# Patient Record
Sex: FEMALE | URBAN - METROPOLITAN AREA
[De-identification: names, ages, dates, MRNs, and addresses within clinical notes are randomized per-mention and may not be internally consistent; named-entity substitution may affect disease eponyms.]

---

## 2021-11-30 ENCOUNTER — APPOINTMENT (RX ONLY)
Dept: URBAN - METROPOLITAN AREA CLINIC 41 | Facility: CLINIC | Age: 51
Setting detail: DERMATOLOGY
End: 2021-11-30

## 2021-11-30 DIAGNOSIS — Z41.9 ENCOUNTER FOR PROCEDURE FOR PURPOSES OTHER THAN REMEDYING HEALTH STATE, UNSPECIFIED: ICD-10-CM

## 2021-11-30 PROCEDURE — ? BOTOX

## 2021-11-30 PROCEDURE — ? COSMETIC QUOTE

## 2021-11-30 PROCEDURE — ? FILLERS

## 2021-11-30 NOTE — PROCEDURE: FILLERS
Include Cannula Size?: 25G
Lateral Face Filler Volume In Cc: 0
Anesthesia Volume In Cc: 0.5
Include Cannula Information In Note?: No
Include Cannula Length?: 1.5 inch
Additional Anesthesia Volume In Cc: 6
Filler: Restylane-L
Detail Level: Detailed
Filler: Restylane Silk
Lot #: [id number]
Expiration Date (Month Year): 5/31/2024
Include Cannula Information In Note?: Yes
Map Statment: See Attach Map for Complete Details
Consent: Written consent obtained. Risks include but not limited to bruising, beading, irregular texture, ulceration, infection, allergic reaction, scar formation, incomplete augmentation, temporary nature, procedural pain.
Lot #: [id number]
Post-Care Instructions: Patient instructed to apply ice to reduce swelling.
Include Cannula Brand?: DermaSculpt
Anesthesia Type: 1% lidocaine with epinephrine
Expiration Date (Month Year): 3/31/2024

## 2021-11-30 NOTE — PROCEDURE: COSMETIC QUOTE
Discount Percentage: 0
Notice: We have created a more complete Cosmetic Quote plan.  The procedure name is also Cosmetic Quote.  Please review the new plan and hide the Cosmetic Quote plan you do not want to use.
Radiesse Price Per Syringe: 500
Botox Price Per Unit: 15
Misc Procedure Description: $500 for 15-20 SKs treated with LN2
Detail Level: Simple

## 2021-11-30 NOTE — PROCEDURE: BOTOX
Show Right And Left Pupillary Line Units: No
Show Additional Area 5: Yes
Post-Care Instructions: Patient instructed to not lie down for 4 hours and limit physical activity for 24 hours.
Additional Area 1 Units: 0
Detail Level: Detailed
Dilution (U/0.1 Cc): 2
Expiration Date (Month Year): 4/2024
Lot #: [id number]
Consent: Written consent obtained. Risks include but not limited to lid/brow ptosis, bruising, swelling, diplopia, temporary effect, incomplete chemical denervation.

## 2021-12-07 ENCOUNTER — APPOINTMENT (RX ONLY)
Dept: URBAN - METROPOLITAN AREA CLINIC 41 | Facility: CLINIC | Age: 51
Setting detail: DERMATOLOGY
End: 2021-12-07

## 2021-12-07 DIAGNOSIS — Z41.9 ENCOUNTER FOR PROCEDURE FOR PURPOSES OTHER THAN REMEDYING HEALTH STATE, UNSPECIFIED: ICD-10-CM

## 2021-12-07 PROCEDURE — ? FILLERS

## 2021-12-07 PROCEDURE — ? COSMETIC CONSULTATION: BLEPHAROPLASTY

## 2021-12-07 PROCEDURE — ? BOTOX

## 2021-12-07 NOTE — PROCEDURE: BOTOX
Show Right And Left Pupillary Line Units: No
Lcl Root Units: 0
Show Additional Area 6: Yes
Consent: Written consent obtained. Risks include but not limited to lid/brow ptosis, bruising, swelling, diplopia, temporary effect, incomplete chemical denervation.
Post-Care Instructions: Patient instructed to not lie down for 4 hours and limit physical activity for 24 hours.
Detail Level: Detailed
Expiration Date (Month Year): 04/31/2024
Dilution (U/0.1 Cc): 2
Lot #: [id number]
Depressor Anguli Oris Units: 4

## 2021-12-07 NOTE — PROCEDURE: FILLERS
Brows Filler Volume In Cc: 0
Include Cannula Information In Note?: No
Map Statment: See Attach Map for Complete Details
Lot #: [id number]
Expiration Date (Month Year): 10-
Anesthesia Type: 1% lidocaine with epinephrine
Anesthesia Volume In Cc: 0.5
Additional Anesthesia Volume In Cc: 6
Consent: Written consent obtained. Risks include but not limited to bruising, beading, irregular texture, ulceration, infection, allergic reaction, scar formation, incomplete augmentation, temporary nature, procedural pain.
Filler: Voluma
Detail Level: Detailed
Post-Care Instructions: Patient instructed to apply ice to reduce swelling.
Lot #: [id number]
Expiration Date (Month Year): 09-

## 2022-04-19 ENCOUNTER — APPOINTMENT (RX ONLY)
Dept: URBAN - METROPOLITAN AREA CLINIC 41 | Facility: CLINIC | Age: 52
Setting detail: DERMATOLOGY
End: 2022-04-19

## 2022-04-19 DIAGNOSIS — Z41.9 ENCOUNTER FOR PROCEDURE FOR PURPOSES OTHER THAN REMEDYING HEALTH STATE, UNSPECIFIED: ICD-10-CM

## 2022-04-19 PROCEDURE — ? FILLERS

## 2022-04-19 PROCEDURE — ? BOTOX

## 2022-04-19 NOTE — PROCEDURE: BOTOX
Show Additional Area 2: Yes
Dilution (U/0.1 Cc): 2
Right Periorbital Skin Units: 0
Expiration Date (Month Year): 10/31/2023
Show Mentalis Units: No
Lot #: [id number]
Consent: Written consent obtained. Risks include but not limited to lid/brow ptosis, bruising, swelling, diplopia, temporary effect, incomplete chemical denervation.
Detail Level: Detailed
Post-Care Instructions: Patient instructed to not lie down for 4 hours and limit physical activity for 24 hours.

## 2022-04-19 NOTE — PROCEDURE: FILLERS
[place] Filler Volume In Cc: 0
Include Cannula Information In Note?: No
Additional Anesthesia Volume In Cc: 6
Anesthesia Volume In Cc: 0.5
Map Statment: See Attach Map for Complete Details
Anesthesia Type: 1% lidocaine with epinephrine
Expiration Date (Month Year): 2024-05-31
Post-Care Instructions: Patient instructed to apply ice to reduce swelling.
Expiration Date (Month Year): 2023-02-02
Consent: Written consent obtained. Risks include but not limited to bruising, beading, irregular texture, ulceration, infection, allergic reaction, scar formation, incomplete augmentation, temporary nature, procedural pain.
Lot #: [id number]
Lot #: [id number]
Detail Level: Detailed
Filler: Juvederm Voluma XC